# Patient Record
Sex: FEMALE | Race: WHITE | NOT HISPANIC OR LATINO | URBAN - METROPOLITAN AREA
[De-identification: names, ages, dates, MRNs, and addresses within clinical notes are randomized per-mention and may not be internally consistent; named-entity substitution may affect disease eponyms.]

---

## 2023-04-06 ENCOUNTER — EMERGENCY (EMERGENCY)
Facility: HOSPITAL | Age: 58
LOS: 0 days | Discharge: HOME | End: 2023-04-06
Attending: EMERGENCY MEDICINE
Payer: MEDICAID

## 2023-04-06 VITALS
TEMPERATURE: 97 F | HEART RATE: 73 BPM | RESPIRATION RATE: 18 BRPM | WEIGHT: 169.98 LBS | OXYGEN SATURATION: 100 % | DIASTOLIC BLOOD PRESSURE: 54 MMHG | SYSTOLIC BLOOD PRESSURE: 109 MMHG

## 2023-04-06 DIAGNOSIS — M79.672 PAIN IN LEFT FOOT: ICD-10-CM

## 2023-04-06 DIAGNOSIS — M25.572 PAIN IN LEFT ANKLE AND JOINTS OF LEFT FOOT: ICD-10-CM

## 2023-04-06 DIAGNOSIS — I10 ESSENTIAL (PRIMARY) HYPERTENSION: ICD-10-CM
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
## 2023-04-06 DIAGNOSIS — S82.832A OTHER FRACTURE OF UPPER AND LOWER END OF LEFT FIBULA, INITIAL ENCOUNTER FOR CLOSED FRACTURE: ICD-10-CM

## 2023-04-06 DIAGNOSIS — R73.03 PREDIABETES: ICD-10-CM
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
## 2023-04-06 PROCEDURE — 73610 X-RAY EXAM OF ANKLE: CPT | Mod: LT

## 2023-04-06 PROCEDURE — 73630 X-RAY EXAM OF FOOT: CPT | Mod: LT

## 2023-04-06 PROCEDURE — 99284 EMERGENCY DEPT VISIT MOD MDM: CPT | Mod: 25

## 2023-04-06 PROCEDURE — 73610 X-RAY EXAM OF ANKLE: CPT | Mod: 26,LT

## 2023-04-06 PROCEDURE — 29515 APPLICATION SHORT LEG SPLINT: CPT | Mod: LT

## 2023-04-06 PROCEDURE — 73630 X-RAY EXAM OF FOOT: CPT | Mod: 26,LT

## 2023-04-06 RX ORDER — IBUPROFEN 200 MG
600 TABLET ORAL ONCE
Refills: 0 | Status: COMPLETED | OUTPATIENT
Start: 2023-04-06 | End: 2023-04-06

## 2023-04-06 RX ADMIN — Medication 600 MILLIGRAM(S): at 05:52

## 2023-04-06 NOTE — ED PROVIDER NOTE - PATIENT PORTAL LINK FT
You can access the FollowMyHealth Patient Portal offered by [facility] by registering at the following website: [email or web address]. By joining [facility]’s FollowMyHealth portal, you will also be able to view your health information using other applications (apps) compatible with our system.

## 2023-04-06 NOTE — ED PROCEDURE NOTE - NS ED ATTENDING STATEMENT MOD
This was a shared visit with the AALIYAH. I reviewed and verified the documentation and independently performed the documented:

## 2023-04-06 NOTE — ED PROVIDER NOTE - NSFOLLOWUPINSTRUCTIONS_ED_ALL_ED_FT
Our Emergency Department Referral Coordinators will be reaching out to you in the next 24-48 hours from 9:00am to 5:00pm with a follow up appointment. Please expect a phone call from the hospital in that time frame. If you do not receive a call or if you have any questions or concerns, you can reach them at   [phone]      Nondisplaced Fibular Ankle Fracture Treated With Immobilization    A nondisplaced fibular ankle fracture is a simple break of the bottom of the fibula. The fibula is the smaller of the two bones in the lower leg and is on the outer side of the leg. In a nondisplaced fracture, the pieces of the broken bone line up with each other and are not out of place. This condition usually does not need surgery and can be treated with a splint, cast, or walking boot.    What are the causes?  This condition may be caused by:  A hard, direct hit or injury to the side of the leg.  A powerful twisting or rotating movement.  Rolling the ankle.  Falling or tripping.  What increases the risk?  The following factors may make you more likely to develop this condition:  Playing sports that involve a lot of running and pivoting, such as basketball.  Playing impact sports, such as football or soccer.  Having diabetes.  Having a history of ankle fractures.  Obesity.  What are the signs or symptoms?  Symptoms of this condition include:  Severe pain that begins right away after your injury.  Bruising.  Swelling.  Being unable to support your body weight with (unable to put weight on) your injured ankle.  An ankle that is tender to the touch.  How is this diagnosed?  This condition is diagnosed based on:  Your medical history.  A physical exam.  Imaging tests to confirm the fracture and to check the extent of the injury. These tests may include:  X-rays.  A stress X-ray. During this test, your health care provider will put pressure on your ankle while taking an X-ray. This will help to determine whether your ankle is stable.  CT scan.  MRI.  How is this treated?  This condition may be treated with:  A splint.  Icing and raising (elevating) the ankle.  A cast.  A removable cast or walking boot.  Crutches. You may need these to help you walk.  Taking medicine to relieve pain.  Follow these instructions at home:  If you have a splint, removable cast, or boot:    Wear the splint, cast, or boot as told by your health care provider. Remove it only as told by your health care provider.  Loosen it if your toes tingle, become numb, or turn cold and blue.  Keep it clean and dry.  If you have a non-removable cast:    Do not put pressure on any part of the cast until it is fully hardened. This may take several hours.  Do not stick anything inside the cast to scratch your skin. Doing that increases your risk of infection.  Check the skin around the cast every day. Tell your health care provider about any concerns.  You may put lotion on dry skin around the edges of the cast. Do not put lotion on the skin underneath the cast.  Keep the cast clean and dry.  Bathing    Do not take baths, swim, or use a hot tub until your health care provider approves. Ask your health care provider if you may take showers. You may only be allowed to take sponge baths.  If the splint, cast, or walking boot is not waterproof:  Do not let it get wet.  Cover it with a watertight covering when you take a bath or shower.  Managing pain, stiffness, and swelling      If directed, put ice on the injured area. To do this:  If you have a removable splint, cast, or boot, remove it as told by your health care provider.  Put ice in a plastic bag.  Place a towel between your skin and the bag, or between your cast and the bag.  Leave the ice on for 20 minutes, 2–3 times a day.  Remove the ice if your skin turns bright red. This is very important. If you cannot feel pain, heat, or cold, you have a greater risk of damage to the area.  Move your toes often to reduce stiffness and swelling.  Elevate the injured area above the level of your heart while you are sitting or lying down.  Activity    Do not use the injured leg to support your body weight until your health care provider says that you can. Use crutches as told by your health care provider.  Resume walking without crutches as told.  Do exercises and stretches as told by your health care provider.  General instructions      Take over-the-counter and prescription medicines only as told by your health care provider.  Ask your health care provider if the medicine prescribed to you:  Requires you to avoid driving or using machinery.  Can cause constipation. You may need to take these actions to prevent or treat constipation:  Take over-the-counter or prescription medicines.  Eat foods that are high in fiber, such as beans, whole grains, and fresh fruits and vegetables.  Limit foods that are high in fat and processed sugars, such as fried or sweet foods.  Ask your health care provider when it is safe to drive if you have a splint, cast, or boot on your ankle.  Do not use any products that contain nicotine or tobacco, such as cigarettes, e-cigarettes, and chewing tobacco. These can delay bone healing. If you need help quitting, ask your health care provider.  Keep all follow-up visits. This is important.  Contact a health care provider if:  Your cast gets damaged or it breaks.  Your pain does not get better with medicine.  Get help right away if:  You develop severe pain or more swelling in your ankle, leg, or foot that cannot be controlled with medicines.  Your skin or nails below the injury turn blue or gray, feel cold, or become numb.  The skin under your cast burns or stings.  There is a bad smell or pus coming from under the cast.  You cannot move your toes.  Summary  A nondisplaced fibular ankle fracture is a simple break of the bottom of a bone in the lower leg (fibula).  This condition may be treated with a splint, icing and elevation, a cast, or a removable cast or walking boot. You may also need crutches to help you walk while your ankle heals.  To help manage pain, stiffness, and swelling, put ice on the injured area as directed by your health care provider.  You should not use the injured leg to support your body weight until your health care provider says that you can. Use crutches as told by your health care provider.  This information is not intended to replace advice given to you by your health care provider. Make sure you discuss any questions you have with your health care provider.

## 2023-04-06 NOTE — ED ADULT NURSE NOTE - NSIMPLEMENTINTERV_GEN_ALL_ED
Implemented All Fall Risk Interventions:  [place] to call system. Call bell, personal items and telephone within reach. Instruct patient to call for assistance. Room bathroom lighting operational. Non-slip footwear when patient is off stretcher. Physically safe environment: no spills, clutter or unnecessary equipment. Stretcher in lowest position, wheels locked, appropriate side rails in place. Provide visual cue, wrist band, yellow gown, etc. Monitor gait and stability. Monitor for mental status changes and reorient to person, place, and time. Review medications for side effects contributing to fall risk. Reinforce activity limits and safety measures with patient and family.

## 2023-04-06 NOTE — ED PROVIDER NOTE - OBJECTIVE STATEMENT
59 yo F, hx of HTN, pre- DM, no AC use, here for assessment sp mechanical fall -- no head trauma, LOC, AC use. Notes L foot and ankle pain. Worse with ambulation and movement.     Took no analgesia prior to coming to ED.

## 2023-04-06 NOTE — ED PROVIDER NOTE - PHYSICAL EXAMINATION
VITAL SIGNS: I have reviewed nursing notes and confirm.  CONSTITUTIONAL: Well-developed; well-nourished; in no acute distress.  SKIN: Skin exam is warm and dry, no acute rash.  HEAD: Normocephalic; atraumatic  EYES: PERRL, EOM intact; conjunctiva and sclera clear.  ENT: No nasal discharge; airway clear.   NECK/BACK: Supple; no midline CTL spine ttp.   CARD: S1, S2 normal; no murmurs, gallops, or rubs. Regular rate and rhythm.  RESP: No wheezes, rales or rhonchi.  ABD: Normal bowel sounds; soft; non-distended; non-tender  EXT: Normal ROM. ttp at lateral mal on L, insertion of ATFL  NEURO: Alert, oriented. Grossly unremarkable. No focal deficits.  PSYCH: Cooperative, appropriate.

## 2023-04-06 NOTE — ED ADULT TRIAGE NOTE - CHIEF COMPLAINT QUOTE
sp fall, injury to right leg, denies loc, denies blood thinners sp fall, injury to left leg, denies loc, denies blood thinners

## 2023-04-06 NOTE — ED PROVIDER NOTE - CLINICAL SUMMARY MEDICAL DECISION MAKING FREE TEXT BOX
Patient with distal fibular fx, placed in splint, provided with crutches.    Remains NV intact, advised on NSAID use, monitoring, return precautions.